# Patient Record
Sex: FEMALE
[De-identification: names, ages, dates, MRNs, and addresses within clinical notes are randomized per-mention and may not be internally consistent; named-entity substitution may affect disease eponyms.]

---

## 2021-08-18 ENCOUNTER — APPOINTMENT (OUTPATIENT)
Dept: ORTHOPEDIC SURGERY | Facility: CLINIC | Age: 51
End: 2021-08-18
Payer: COMMERCIAL

## 2021-08-18 VITALS
DIASTOLIC BLOOD PRESSURE: 82 MMHG | HEIGHT: 67 IN | WEIGHT: 150 LBS | SYSTOLIC BLOOD PRESSURE: 118 MMHG | BODY MASS INDEX: 23.54 KG/M2

## 2021-08-18 DIAGNOSIS — S93.491S SPRAIN OF OTHER LIGAMENT OF RIGHT ANKLE, SEQUELA: ICD-10-CM

## 2021-08-18 DIAGNOSIS — G89.29 PAIN IN RIGHT ANKLE AND JOINTS OF RIGHT FOOT: ICD-10-CM

## 2021-08-18 DIAGNOSIS — Z78.9 OTHER SPECIFIED HEALTH STATUS: ICD-10-CM

## 2021-08-18 DIAGNOSIS — Z86.718 PERSONAL HISTORY OF OTHER VENOUS THROMBOSIS AND EMBOLISM: ICD-10-CM

## 2021-08-18 DIAGNOSIS — M25.571 PAIN IN RIGHT ANKLE AND JOINTS OF RIGHT FOOT: ICD-10-CM

## 2021-08-18 PROCEDURE — 73610 X-RAY EXAM OF ANKLE: CPT | Mod: RT

## 2021-08-18 PROCEDURE — 73620 X-RAY EXAM OF FOOT: CPT | Mod: RT

## 2021-08-18 PROCEDURE — 99203 OFFICE O/P NEW LOW 30 MIN: CPT

## 2021-08-18 NOTE — HISTORY OF PRESENT ILLNESS
[de-identified] : 50 yo comes in for evaluation of her right ankle where she started to have pain January 2021 without any specific injury at that time.  She has had many prior ankle sprains in the past.\par She was having trouble walking in the winter and would have intermittent pain that was sharp and stabbing at times.  Sometimes it felt like it might give way.  She had done a lot of martial arts in the past.  She had done some massage and home exercises she found on the Internet which were helpful.  She still gets intermittent pain that is not quite as severe but can happen with a lot of walking and putting pressure on it.  Its better with rest and relaxing.  She had taken a little Advil initially when it hurt but nothing recently.  No swelling or locking or buckling.  She did not come in sooner because her  was recovering from surgery and treatment from prostate cancer.

## 2021-08-18 NOTE — PHYSICAL EXAM
[LE] : Sensory: Intact in bilateral lower extremities [DP] : dorsalis pedis 2+ and symmetric bilaterally [PT] : posterior tibial 2+ and symmetric bilaterally [Normal RLE] : Right Lower Extremity: No scars, rashes, lesions, ulcers, skin intact [Normal LLE] : Left Lower Extremity: No scars, rashes, lesions, ulcers, skin intact [Normal Touch] : sensation intact for touch [Normal] : Oriented to person, place, and time, insight and judgement were intact and the affect was normal [de-identified] : Right ankle with left for comparison\par She walks with a nonantalgic gait and can walk on her heels and toes without difficulty.\par There is mild tenderness in the right anteromedial ankle as well as the anterolateral gutter and sinus Tarsi more on the right than left.  No posterior tenderness.\par No pain with forced plantar flexion of the ankle.\par Range of motion is with about 8 degrees dorsiflexion and 35 degrees plantarflexion.  Subtalar motion is within normal limits and without pain.\par Anterolateral drawer and varus tilt are not significantly increased.\par 5/5 anterior tibial tendon, gastrocsoleus, peroneals, posterior tibial tendon, EHL.\par Sensation is intact.\par  [de-identified] : \par X-rays of the right ankle and foot weightbearing 5 views taken today are unremarkable aside from 2-3 small round loose bodies i posterior to the ankle joint. On the lateral view it looks like she may have hammertoes but she was holding the foot in a somewhat awkward position for the image.  At rest her toes are all straight.\par Ankle joint is without any visible osteochondral lesion.

## 2021-08-18 NOTE — ASSESSMENT
[FreeTextEntry1] : 52 yo right ankle pain over the last 6 to 8 months.  She does have a history of multiple ankle sprains in the past but her ankle does not feel unstable on exam and that has not been the recent issue.  It looks like there are some small loose bodies posterior to the ankle joint and there may have been some chondral injury without any obvious osteochondral lesion on x-rays.  Perhaps these loose bodies were impinging into her joint and causing some of the sensations which seem to have been more severe several months ago than they are now.  If the loose bodies stay posteriorly and do not cause any mechanical symptoms then they would not need to be removed.  If it seems like she is having pain then arthroscopy may be considered with removal. \par I will have her work on strengthening and do physical therapy for the ankle.  Heat and ice as needed.  I suggested taking naproxen 440 mg twice a day for a week and then once a day for a week and then if needed.\par Follow-up if her ankle is not better in the next 6 to 8 weeks or as needed.  The next step would likely be to get an MRI.  Steroid injection may be considered as well.

## 2024-07-22 ENCOUNTER — APPOINTMENT (OUTPATIENT)
Dept: ORTHOPEDIC SURGERY | Facility: CLINIC | Age: 54
End: 2024-07-22
Payer: COMMERCIAL

## 2024-07-22 DIAGNOSIS — M72.2 PLANTAR FASCIAL FIBROMATOSIS: ICD-10-CM

## 2024-07-22 DIAGNOSIS — C80.1 MALIGNANT (PRIMARY) NEOPLASM, UNSPECIFIED: ICD-10-CM

## 2024-07-22 DIAGNOSIS — K90.0 CELIAC DISEASE: ICD-10-CM

## 2024-07-22 DIAGNOSIS — S93.491S SPRAIN OF OTHER LIGAMENT OF RIGHT ANKLE, SEQUELA: ICD-10-CM

## 2024-07-22 PROCEDURE — 73610 X-RAY EXAM OF ANKLE: CPT | Mod: LT

## 2024-07-22 PROCEDURE — 99213 OFFICE O/P EST LOW 20 MIN: CPT

## 2024-07-22 PROCEDURE — 73620 X-RAY EXAM OF FOOT: CPT | Mod: LT

## 2024-07-22 RX ORDER — PROGESTERONE 100 MG/1
100 CAPSULE ORAL
Refills: 0 | Status: ACTIVE | COMMUNITY

## 2024-07-22 RX ORDER — ESTRADIOL 0.05 MG/D
0.05 PATCH, EXTENDED RELEASE TRANSDERMAL
Refills: 0 | Status: ACTIVE | COMMUNITY

## 2024-07-22 NOTE — ASSESSMENT
[FreeTextEntry1] : 54-year-old with intermittent left ankle locking and catching likely with an osteochondral lesion of the medial talar dome with some subtle changes on x-ray.  She is getting more frequent episodes happening every few days of locking in the ankle that can last an hour. I recommended an MRI to evaluate further.  Loose body could also cause locking. Depending on findings we may decide on different treatment options which may include arthroscopic surgery versus possibly trying a steroid injection.  In between episodes her ankle is fine and I did not feel she would likely benefit from physical therapy but gave her some home exercises to do which she has been doing on her own.  She has been taking NSAIDs which she can continue to do as needed.

## 2024-07-22 NOTE — PHYSICAL EXAM
[LE] : Sensory: Intact in bilateral lower extremities [Normal RLE] : Right Lower Extremity: No scars, rashes, lesions, ulcers, skin intact [Normal LLE] : Left Lower Extremity: No scars, rashes, lesions, ulcers, skin intact [Normal Touch] : sensation intact for touch [Normal] : Gait: normal [de-identified] : Left ankle with right for comparison No edema, ecchymoses, erythema, deformity. She can walk on her heels and toes without difficulty. 5/5 anterior tibial tendon, gastrocsoleus, peroneals, posterior tibial tendon, EHL. Sensation is intact. Ankle motion is with about 5 to 10 degrees dorsiflexion and 35 degrees plantarflexion without pain.  Normal subtalar motion. Mildly tender anteromedial ankle. Motor and sensation are intact [de-identified] :   X-rays ordered, performed and reviewed today of ankle for ankle pain weightbearing 3 views showed slight irregularity/flattening of the medial shoulder of the talus as seen on the mortise view.  No cysts or definite osteochondral lesion otherwise in the talus.  Mortise is intact.  No fractures.  No loose bodies

## 2024-07-22 NOTE — HISTORY OF PRESENT ILLNESS
[de-identified] :  Ms. Yoon is a 54 year old woman who comes in for evaluation for left ankle that intermittently freezes up. It started in February, 2024 and it stopped and came back one month later. Right now, it happens every two or three days. Patient denies any injury or accident.  Last week it has been occurring every 2 to 3 days.  It last occurred 3 days ago.  She has a history of Planter fasciitis 12 years ago so she does not wear flat shoes but she does not have any heel pain.  Or chair.  She takes ibuprofen occasionally.  Pain when it occurs can be quite sharp and severe.  Pain on average would be 5 out of 10 and locks up and then gets a dull lingering pain.  She gets some pain with dorsiflexion and plantarflexion. When the ankle walks it takes about an hour or so for the motion to come back and she cannot walk.  She takes ibuprofen when it is painful. She has been doing a lot of ankle range of motion exercises and stretches.  She had done physical therapy for her right ankle in the past when I saw her and she has been doing those exercises. The locking seems to occur after she has been sitting and perhaps after she grabs her ankles around the legs of the desk

## 2024-07-31 ENCOUNTER — NON-APPOINTMENT (OUTPATIENT)
Age: 54
End: 2024-07-31

## 2024-07-31 ENCOUNTER — APPOINTMENT (OUTPATIENT)
Dept: ORTHOPEDIC SURGERY | Facility: CLINIC | Age: 54
End: 2024-07-31
Payer: COMMERCIAL

## 2024-07-31 ENCOUNTER — TRANSCRIPTION ENCOUNTER (OUTPATIENT)
Age: 54
End: 2024-07-31

## 2024-07-31 DIAGNOSIS — M89.9 DISORDER OF BONE, UNSPECIFIED: ICD-10-CM

## 2024-07-31 DIAGNOSIS — M25.572 PAIN IN LEFT ANKLE AND JOINTS OF LEFT FOOT: ICD-10-CM

## 2024-07-31 DIAGNOSIS — M94.9 DISORDER OF BONE, UNSPECIFIED: ICD-10-CM

## 2024-07-31 DIAGNOSIS — S92.025K: ICD-10-CM

## 2024-07-31 PROCEDURE — G2012 BRIEF CHECK IN BY MD/QHP: CPT

## 2024-11-18 ENCOUNTER — NON-APPOINTMENT (OUTPATIENT)
Age: 54
End: 2024-11-18

## 2024-12-09 ENCOUNTER — APPOINTMENT (OUTPATIENT)
Dept: ORTHOPEDIC SURGERY | Facility: CLINIC | Age: 54
End: 2024-12-09
Payer: COMMERCIAL

## 2024-12-09 DIAGNOSIS — M25.572 PAIN IN LEFT ANKLE AND JOINTS OF LEFT FOOT: ICD-10-CM

## 2024-12-09 DIAGNOSIS — M25.571 PAIN IN RIGHT ANKLE AND JOINTS OF RIGHT FOOT: ICD-10-CM

## 2024-12-09 DIAGNOSIS — M89.9 DISORDER OF BONE, UNSPECIFIED: ICD-10-CM

## 2024-12-09 DIAGNOSIS — M77.51 OTHER ENTHESOPATHY OF RT FOOT AND ANKLE: ICD-10-CM

## 2024-12-09 DIAGNOSIS — G89.29 PAIN IN RIGHT ANKLE AND JOINTS OF RIGHT FOOT: ICD-10-CM

## 2024-12-09 DIAGNOSIS — S92.025K: ICD-10-CM

## 2024-12-09 DIAGNOSIS — M94.9 DISORDER OF BONE, UNSPECIFIED: ICD-10-CM

## 2024-12-09 PROCEDURE — 99214 OFFICE O/P EST MOD 30 MIN: CPT

## 2025-09-03 ENCOUNTER — NON-APPOINTMENT (OUTPATIENT)
Age: 55
End: 2025-09-03

## 2025-09-03 ENCOUNTER — APPOINTMENT (OUTPATIENT)
Dept: ORTHOPEDIC SURGERY | Facility: CLINIC | Age: 55
End: 2025-09-03
Payer: COMMERCIAL

## 2025-09-03 DIAGNOSIS — S92.025K: ICD-10-CM

## 2025-09-03 DIAGNOSIS — M25.572 PAIN IN LEFT ANKLE AND JOINTS OF LEFT FOOT: ICD-10-CM

## 2025-09-03 DIAGNOSIS — M79.672 PAIN IN LEFT FOOT: ICD-10-CM

## 2025-09-03 PROCEDURE — 99213 OFFICE O/P EST LOW 20 MIN: CPT

## 2025-09-03 PROCEDURE — 73630 X-RAY EXAM OF FOOT: CPT | Mod: LT

## 2025-09-03 PROCEDURE — 73600 X-RAY EXAM OF ANKLE: CPT | Mod: LT

## 2025-09-16 ENCOUNTER — APPOINTMENT (OUTPATIENT)
Dept: ORTHOPEDIC SURGERY | Facility: CLINIC | Age: 55
End: 2025-09-16
Payer: COMMERCIAL

## 2025-09-16 DIAGNOSIS — M79.672 PAIN IN LEFT FOOT: ICD-10-CM

## 2025-09-16 DIAGNOSIS — M25.572 PAIN IN LEFT ANKLE AND JOINTS OF LEFT FOOT: ICD-10-CM

## 2025-09-16 PROCEDURE — 99213 OFFICE O/P EST LOW 20 MIN: CPT

## 2025-09-23 PROBLEM — G57.61 MORTON'S NEUROMA OF SECOND INTERSPACE OF RIGHT FOOT: Status: ACTIVE | Noted: 2025-09-23

## 2025-09-23 PROBLEM — M25.871 SESAMOIDITIS OF RIGHT FOOT: Status: ACTIVE | Noted: 2025-09-23

## 2025-09-23 PROBLEM — G57.61 MORTON'S NEUROMA OF THIRD INTERSPACE OF RIGHT FOOT: Status: ACTIVE | Noted: 2025-09-23

## 2025-09-23 PROBLEM — M19.071 ARTHROSIS OF RIGHT MIDFOOT: Status: ACTIVE | Noted: 2025-09-23
